# Patient Record
Sex: FEMALE | Race: WHITE
[De-identification: names, ages, dates, MRNs, and addresses within clinical notes are randomized per-mention and may not be internally consistent; named-entity substitution may affect disease eponyms.]

---

## 2019-06-01 ENCOUNTER — HOSPITAL ENCOUNTER (OUTPATIENT)
Dept: HOSPITAL 80 - FED | Age: 34
Setting detail: OBSERVATION
LOS: 1 days | Discharge: HOME | End: 2019-06-02
Attending: INTERNAL MEDICINE | Admitting: INTERNAL MEDICINE
Payer: COMMERCIAL

## 2019-06-01 DIAGNOSIS — G40.309: Primary | ICD-10-CM

## 2019-06-01 DIAGNOSIS — D64.9: ICD-10-CM

## 2019-06-01 LAB — PLATELET # BLD: 252 10^3/UL (ref 150–400)

## 2019-06-01 PROCEDURE — G0480 DRUG TEST DEF 1-7 CLASSES: HCPCS

## 2019-06-01 PROCEDURE — 96376 TX/PRO/DX INJ SAME DRUG ADON: CPT

## 2019-06-01 PROCEDURE — 96365 THER/PROPH/DIAG IV INF INIT: CPT

## 2019-06-01 PROCEDURE — 70450 CT HEAD/BRAIN W/O DYE: CPT

## 2019-06-01 PROCEDURE — 96375 TX/PRO/DX INJ NEW DRUG ADDON: CPT

## 2019-06-01 PROCEDURE — 99285 EMERGENCY DEPT VISIT HI MDM: CPT

## 2019-06-01 PROCEDURE — G0378 HOSPITAL OBSERVATION PER HR: HCPCS

## 2019-06-01 PROCEDURE — 96372 THER/PROPH/DIAG INJ SC/IM: CPT

## 2019-06-01 PROCEDURE — 93005 ELECTROCARDIOGRAM TRACING: CPT

## 2019-06-01 RX ADMIN — TOPIRAMATE SCH MG: 100 TABLET, FILM COATED ORAL at 21:36

## 2019-06-01 RX ADMIN — LEVETIRACETAM SCH MLS: 15 INJECTION INTRAVENOUS at 21:25

## 2019-06-01 RX ADMIN — TOPIRAMATE SCH MG: 25 TABLET, COATED ORAL at 21:26

## 2019-06-01 RX ADMIN — SODIUM CHLORIDE SCH MLS: 900 INJECTION, SOLUTION INTRAVENOUS at 17:08
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
## 2019-06-01 NOTE — CPEKG
Test Reason : OPEN

Blood Pressure : ***/*** mmHG

Vent. Rate : 101 BPM     Atrial Rate : 101 BPM

   P-R Int : 128 ms          QRS Dur : 064 ms

    QT Int : 346 ms       P-R-T Axes : 063 079 035 degrees

   QTc Int : 449 ms

 

Sinus tachycardia

Minimal ST depression, diffuse leads

 

Confirmed by Rona Cho ([id number]) on 6/1/2019 3:06:13 PM

 

Referred By: Rona Cho           Confirmed By:Rona Cho

## 2019-06-02 VITALS — DIASTOLIC BLOOD PRESSURE: 76 MMHG | SYSTOLIC BLOOD PRESSURE: 109 MMHG

## 2019-06-02 LAB — PLATELET # BLD: 185 10^3/UL (ref 150–400)

## 2019-06-02 RX ADMIN — TOPIRAMATE SCH MG: 25 TABLET, COATED ORAL at 09:22

## 2019-06-02 RX ADMIN — SODIUM CHLORIDE SCH MLS: 900 INJECTION, SOLUTION INTRAVENOUS at 01:10

## 2019-06-02 RX ADMIN — LEVETIRACETAM SCH MLS: 15 INJECTION INTRAVENOUS at 09:21

## 2019-06-02 RX ADMIN — TOPIRAMATE SCH MG: 100 TABLET, FILM COATED ORAL at 09:22
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
## 2019-06-02 NOTE — ASMTLACE
LACE

 

Length of stay for            Answers:  2 days                                

current admission                                                             

Acuity / Level of             Answers:  No                                    

Care: Did the patient                                                         

have an inpatient                                                             

admission?                                                                    

Comorbidities - select        Answers:  Other                         Notes:  Seizure dis

all that apply                                                                

# of Emergency department     Answers:  1-2                                   

visits in the last 6                                                          

months                                                                        

Score: 4

 

Date Signed:  06/02/2019 12:35 PM

Electronically Signed By:CLARISSE Canchola